# Patient Record
Sex: MALE | Race: WHITE | NOT HISPANIC OR LATINO | Employment: OTHER | URBAN - METROPOLITAN AREA
[De-identification: names, ages, dates, MRNs, and addresses within clinical notes are randomized per-mention and may not be internally consistent; named-entity substitution may affect disease eponyms.]

---

## 2024-02-19 PROBLEM — N20.0 NEPHROLITHIASIS: Status: RESOLVED | Noted: 2024-02-19 | Resolved: 2024-02-19

## 2024-02-19 PROBLEM — B35.1 ONYCHOMYCOSIS: Status: RESOLVED | Noted: 2024-02-19 | Resolved: 2024-02-19

## 2024-02-19 PROBLEM — N28.1 KIDNEY CYST, ACQUIRED: Status: RESOLVED | Noted: 2024-02-19 | Resolved: 2024-02-19

## 2024-02-19 PROBLEM — M25.551 ACUTE RIGHT HIP PAIN: Status: RESOLVED | Noted: 2024-02-19 | Resolved: 2024-02-19

## 2024-02-19 PROBLEM — E78.5 BORDERLINE HYPERLIPIDEMIA: Status: ACTIVE | Noted: 2024-02-19

## 2024-02-19 PROBLEM — R17 ELEVATED BILIRUBIN: Status: RESOLVED | Noted: 2024-02-19 | Resolved: 2024-02-19

## 2024-02-19 PROBLEM — B96.89 ACUTE BACTERIAL BRONCHITIS: Status: RESOLVED | Noted: 2024-02-19 | Resolved: 2024-02-19

## 2024-02-19 PROBLEM — J20.8 ACUTE BACTERIAL BRONCHITIS: Status: RESOLVED | Noted: 2024-02-19 | Resolved: 2024-02-19

## 2024-02-19 PROBLEM — H10.30 ACUTE BACTERIAL CONJUNCTIVITIS: Status: RESOLVED | Noted: 2024-02-19 | Resolved: 2024-02-19

## 2024-02-19 PROBLEM — G89.29 CHRONIC BACK PAIN: Status: ACTIVE | Noted: 2024-02-19

## 2024-02-19 PROBLEM — K63.5 COLON POLYP: Status: RESOLVED | Noted: 2024-02-19 | Resolved: 2024-02-19

## 2024-02-19 PROBLEM — M54.9 CHRONIC BACK PAIN: Status: ACTIVE | Noted: 2024-02-19

## 2024-02-19 PROBLEM — E11.9 DIABETES MELLITUS, TYPE 2 (MULTI): Status: ACTIVE | Noted: 2024-02-19

## 2024-02-19 PROBLEM — J30.9 CHRONIC ALLERGIC RHINITIS: Status: ACTIVE | Noted: 2024-02-19

## 2024-02-19 PROBLEM — H66.90 ACUTE OTITIS MEDIA: Status: RESOLVED | Noted: 2024-02-19 | Resolved: 2024-02-19

## 2024-02-19 PROBLEM — R93.7 ABNORMAL BONE XRAY: Status: RESOLVED | Noted: 2024-02-19 | Resolved: 2024-02-19

## 2024-02-19 RX ORDER — CICLOPIROX 80 MG/ML
SOLUTION TOPICAL EVERY 12 HOURS
COMMUNITY
Start: 2020-04-17

## 2024-02-19 RX ORDER — METFORMIN HYDROCHLORIDE 500 MG/1
TABLET ORAL
COMMUNITY
Start: 2018-06-18

## 2024-02-19 RX ORDER — NIRMATRELVIR AND RITONAVIR 300-100 MG
KIT ORAL
COMMUNITY
Start: 2024-02-09

## 2024-02-19 RX ORDER — OMEPRAZOLE 40 MG/1
CAPSULE, DELAYED RELEASE ORAL
COMMUNITY
Start: 2024-02-16

## 2024-02-19 RX ORDER — GLIPIZIDE 5 MG/1
TABLET ORAL
COMMUNITY
Start: 2018-10-18

## 2024-02-19 RX ORDER — LISINOPRIL AND HYDROCHLOROTHIAZIDE 20; 25 MG/1; MG/1
TABLET ORAL
COMMUNITY
Start: 2018-04-11

## 2024-02-19 RX ORDER — ASPIRIN 81 MG/1
TABLET ORAL
COMMUNITY
Start: 2018-04-11

## 2024-02-19 RX ORDER — ROSUVASTATIN CALCIUM 40 MG/1
1 TABLET, COATED ORAL DAILY
COMMUNITY
Start: 2018-04-11

## 2024-02-19 NOTE — PROGRESS NOTES
NPV- hiatal hernia, GERD    GERD Health Related Quality of Life (HRQL) Questionnaire    Heartburn Questions  1. How bad is the heartburn? Response Scale: 2 = Noticeable, bothersome but not everyday  2. Heartburn when lying down? Response Scale: 3 = Bothersome daily  3. Heartburn when standing up? Response Scale: 1 = Noticeable, but no bothersome  4. Heartburn after meals? Response Scale: 2 = Noticeable, bothersome but not everyday  5. Does heartburn change your diet? Response Scale: 3 = Bothersome daily  6. Does heartburn wake you from sleep? Response Scale: 3 = Bothersome daily    7. Do you have difficulty swallowing? Response Scale: 1 = Noticeable, but no bothersome  8. Do you have pain with swallowing? Response Scale: 1 = Noticeable, but no bothersome  9. Do you have gassy or bloating feelings? Response Scale: 4 = Bothersome and affects daily activities  10. If you take medication, does this affect your daily life? Response Scale: 0 = No Symptoms    Regurgitation Questions  11. How bad is the regurgitation? Response Scale: 2 = Noticeable, bothersome but not everyday  12. Regurgitation when lying down? Response Scale: 2 = Noticeable, bothersome but not everyday  13. Regurgitation when standing up? Response Scale: 1 = Noticeable, but no bothersome  14. Regurgitation after meals? Response Scale: 1 = Noticeable, but no bothersome  15. Does regurgitation change your diet? Response Scale: 1 = Noticeable, but no bothersome  16. Does regurgitation wake you from sleep? Response Scale: 2 = Noticeable, bothersome but not everyday    Are you currently taking any medications for heartburn or GERD? PPI: Yes, medication: omeprazole  How satisfied are you with your present condition? Satisfaction: Dissatisfied    Total score (sum questions 1-16): 29  Greatest possible score 80 (worst symptoms).  Lowest possible score 0 (no symptoms).    Heartburn score (sum questions 1-6): 14  Worst heartburn symptoms: 30.  No heartburn  symptoms: 0.  Score less than or equal to 12 with each individual question not exceeding 2 indicate heartburn elimination.    Regurgitation score (sum questions 11-16): 9  Worst regurgitation symptoms: 30.  No regurgitation symptoms: 0.  Score less than or equal to 12 with each individual question not exceeding 2 indicate regurgitation elimination.

## 2024-02-20 ENCOUNTER — OFFICE VISIT (OUTPATIENT)
Dept: SURGERY | Facility: CLINIC | Age: 64
End: 2024-02-20
Payer: MEDICARE

## 2024-02-20 VITALS
WEIGHT: 199.6 LBS | SYSTOLIC BLOOD PRESSURE: 153 MMHG | TEMPERATURE: 98.3 F | HEIGHT: 67 IN | HEART RATE: 102 BPM | RESPIRATION RATE: 18 BRPM | BODY MASS INDEX: 31.33 KG/M2 | OXYGEN SATURATION: 96 % | DIASTOLIC BLOOD PRESSURE: 89 MMHG

## 2024-02-20 DIAGNOSIS — R06.02 SHORTNESS OF BREATH: ICD-10-CM

## 2024-02-20 DIAGNOSIS — R13.19 ESOPHAGEAL DYSPHAGIA: Primary | ICD-10-CM

## 2024-02-20 DIAGNOSIS — K44.9 HIATAL HERNIA: ICD-10-CM

## 2024-02-20 PROCEDURE — 1159F MED LIST DOCD IN RCRD: CPT | Performed by: SURGERY

## 2024-02-20 PROCEDURE — 3079F DIAST BP 80-89 MM HG: CPT | Performed by: SURGERY

## 2024-02-20 PROCEDURE — 3077F SYST BP >= 140 MM HG: CPT | Performed by: SURGERY

## 2024-02-20 PROCEDURE — 99204 OFFICE O/P NEW MOD 45 MIN: CPT | Performed by: SURGERY

## 2024-02-20 PROCEDURE — 1036F TOBACCO NON-USER: CPT | Performed by: SURGERY

## 2024-02-20 PROCEDURE — 1126F AMNT PAIN NOTED NONE PRSNT: CPT | Performed by: SURGERY

## 2024-02-20 ASSESSMENT — ENCOUNTER SYMPTOMS
ENDOCRINE NEGATIVE: 1
CONSTITUTIONAL NEGATIVE: 1
SHORTNESS OF BREATH: 1
ABDOMINAL DISTENTION: 1
ABDOMINAL PAIN: 1
EYES NEGATIVE: 1

## 2024-02-20 ASSESSMENT — PAIN SCALES - GENERAL: PAINLEVEL: 0-NO PAIN

## 2024-02-20 NOTE — PROGRESS NOTES
Subjective   Patient ID: Gian Galarza is a 64 y.o. male who presents for Hernia (Hiatal, gerd).  HPI  65 YO M BMI 31 presents with concerns over possible hiatal hernia. HRQL GERD survey is 29, he has some issues with dysphagia occasionally along with a friend, with food getting stuck. He has difficulty breathing whenever he bends over. EGD 02/04/2024 showed no hiatal hernia, and normal z line, biopsies of esophagus were normal. He has a small umbilical hernia and a large area of diastasis rectus above it in the epigastric region. He says his reflux is better since starting omeprazole. His outside imaging reports mention nothing about a hiatal hernia. He has not yet seen cardiology or pulmonology.    Review of Systems   Constitutional: Negative.    HENT: Negative.     Eyes: Negative.    Respiratory:  Positive for shortness of breath.    Gastrointestinal:  Positive for abdominal distention and abdominal pain.   Endocrine: Negative.        Objective   Physical Exam  Constitutional:       Appearance: Normal appearance.   HENT:      Head: Atraumatic.      Nose: Nose normal.      Mouth/Throat:      Mouth: Mucous membranes are moist.   Cardiovascular:      Rate and Rhythm: Normal rate and regular rhythm.   Pulmonary:      Effort: Pulmonary effort is normal.      Breath sounds: Normal breath sounds.   Abdominal:      General: There is no distension.      Palpations: Abdomen is soft.      Tenderness: There is no guarding or rebound.      Comments: Small umbilical hernia with diastasis above it.   Skin:     General: Skin is warm.   Neurological:      Mental Status: He is alert. Mental status is at baseline.   Psychiatric:         Mood and Affect: Mood normal.         Thought Content: Thought content normal.         Judgment: Judgment normal.         Assessment/Plan   Problem List Items Addressed This Visit             ICD-10-CM       Gastrointestinal and Abdominal    Esophageal dysphagia - Primary R13.19     CT CAP first  to rule out HH and look for lung issues.  Manometry if nothing detected.  Continue omeprazole.         Relevant Orders    CT chest abdomen pelvis wo IV contrast     Other Visit Diagnoses         Codes    Hiatal hernia     K44.9    Shortness of breath     R06.02    Relevant Orders    CT chest abdomen pelvis wo IV contrast                 Niels Yap MD PhD 02/20/24 9:57 AM

## 2024-02-20 NOTE — ASSESSMENT & PLAN NOTE
CT CAP first to rule out HH and look for lung issues.  Manometry if nothing detected.  Continue omeprazole.

## 2024-03-07 ENCOUNTER — HOSPITAL ENCOUNTER (OUTPATIENT)
Dept: RADIOLOGY | Facility: HOSPITAL | Age: 64
Discharge: HOME | End: 2024-03-07
Payer: MEDICARE

## 2024-03-07 DIAGNOSIS — R13.19 ESOPHAGEAL DYSPHAGIA: ICD-10-CM

## 2024-03-07 DIAGNOSIS — R06.02 SHORTNESS OF BREATH: ICD-10-CM

## 2024-03-07 PROCEDURE — 71250 CT THORAX DX C-: CPT | Performed by: RADIOLOGY

## 2024-03-07 PROCEDURE — 74176 CT ABD & PELVIS W/O CONTRAST: CPT | Performed by: RADIOLOGY

## 2024-03-07 PROCEDURE — 74176 CT ABD & PELVIS W/O CONTRAST: CPT

## 2024-03-11 ENCOUNTER — TELEPHONE (OUTPATIENT)
Dept: SURGERY | Facility: CLINIC | Age: 64
End: 2024-03-11
Payer: MEDICARE

## 2024-03-11 DIAGNOSIS — K21.9 GASTROESOPHAGEAL REFLUX DISEASE WITHOUT ESOPHAGITIS: Primary | ICD-10-CM

## 2024-03-11 NOTE — TELEPHONE ENCOUNTER
Called patient to schedule him for an EGD per Dr. Yap.  After talking with patient he states he had an EGD about four weeks at [facility] off of [facility].  Patient wanted to make sure that Dr. Yap saw that before scheduling another test.  I informed the patient I would let Dr. Yap know of this and would be in touch with him after hearing back.